# Patient Record
Sex: MALE | Race: ASIAN | NOT HISPANIC OR LATINO | URBAN - METROPOLITAN AREA
[De-identification: names, ages, dates, MRNs, and addresses within clinical notes are randomized per-mention and may not be internally consistent; named-entity substitution may affect disease eponyms.]

---
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
## 2022-02-09 ENCOUNTER — EMERGENCY (EMERGENCY)
Facility: HOSPITAL | Age: 22
LOS: 1 days | Discharge: HOME | End: 2022-02-09
Attending: EMERGENCY MEDICINE | Admitting: EMERGENCY MEDICINE
Payer: MEDICAID

## 2022-02-09 VITALS
SYSTOLIC BLOOD PRESSURE: 119 MMHG | HEART RATE: 74 BPM | DIASTOLIC BLOOD PRESSURE: 67 MMHG | OXYGEN SATURATION: 100 % | RESPIRATION RATE: 18 BRPM

## 2022-02-09 VITALS
OXYGEN SATURATION: 100 % | SYSTOLIC BLOOD PRESSURE: 127 MMHG | RESPIRATION RATE: 18 BRPM | HEART RATE: 88 BPM | DIASTOLIC BLOOD PRESSURE: 76 MMHG | TEMPERATURE: 98 F | HEIGHT: 68.9 IN

## 2022-02-09 PROBLEM — Z78.9 OTHER SPECIFIED HEALTH STATUS: Chronic | Status: ACTIVE | Noted: 2021-08-31

## 2022-02-09 LAB
ALBUMIN SERPL ELPH-MCNC: 4.5 G/DL — SIGNIFICANT CHANGE UP (ref 3.3–5)
ALP SERPL-CCNC: 52 U/L — LOW (ref 60–270)
ALT FLD-CCNC: 13 U/L — SIGNIFICANT CHANGE UP (ref 4–41)
ANION GAP SERPL CALC-SCNC: 9 MMOL/L — SIGNIFICANT CHANGE UP (ref 7–14)
APPEARANCE UR: CLEAR — SIGNIFICANT CHANGE UP
AST SERPL-CCNC: 14 U/L — SIGNIFICANT CHANGE UP (ref 4–40)
BACTERIA # UR AUTO: ABNORMAL
BASOPHILS # BLD AUTO: 0.04 K/UL — SIGNIFICANT CHANGE UP (ref 0–0.2)
BASOPHILS NFR BLD AUTO: 0.6 % — SIGNIFICANT CHANGE UP (ref 0–2)
BILIRUB SERPL-MCNC: 0.6 MG/DL — SIGNIFICANT CHANGE UP (ref 0.2–1.2)
BILIRUB UR-MCNC: NEGATIVE — SIGNIFICANT CHANGE UP
BUN SERPL-MCNC: 8 MG/DL — SIGNIFICANT CHANGE UP (ref 7–23)
CALCIUM SERPL-MCNC: 9.8 MG/DL — SIGNIFICANT CHANGE UP (ref 8.4–10.5)
CHLORIDE SERPL-SCNC: 102 MMOL/L — SIGNIFICANT CHANGE UP (ref 98–107)
CO2 SERPL-SCNC: 27 MMOL/L — SIGNIFICANT CHANGE UP (ref 22–31)
COLOR SPEC: SIGNIFICANT CHANGE UP
CREAT SERPL-MCNC: 0.9 MG/DL — SIGNIFICANT CHANGE UP (ref 0.5–1.3)
DIFF PNL FLD: NEGATIVE — SIGNIFICANT CHANGE UP
EOSINOPHIL # BLD AUTO: 0.07 K/UL — SIGNIFICANT CHANGE UP (ref 0–0.5)
EOSINOPHIL NFR BLD AUTO: 1 % — SIGNIFICANT CHANGE UP (ref 0–6)
EPI CELLS # UR: 0 /HPF — SIGNIFICANT CHANGE UP (ref 0–5)
GLUCOSE SERPL-MCNC: 88 MG/DL — SIGNIFICANT CHANGE UP (ref 70–99)
GLUCOSE UR QL: NEGATIVE — SIGNIFICANT CHANGE UP
HCT VFR BLD CALC: 41.9 % — SIGNIFICANT CHANGE UP (ref 39–50)
HGB BLD-MCNC: 13.9 G/DL — SIGNIFICANT CHANGE UP (ref 13–17)
IANC: 4.54 K/UL — SIGNIFICANT CHANGE UP (ref 1.5–8.5)
IMM GRANULOCYTES NFR BLD AUTO: 0.3 % — SIGNIFICANT CHANGE UP (ref 0–1.5)
KETONES UR-MCNC: NEGATIVE — SIGNIFICANT CHANGE UP
LEUKOCYTE ESTERASE UR-ACNC: NEGATIVE — SIGNIFICANT CHANGE UP
LYMPHOCYTES # BLD AUTO: 2.13 K/UL — SIGNIFICANT CHANGE UP (ref 1–3.3)
LYMPHOCYTES # BLD AUTO: 29.3 % — SIGNIFICANT CHANGE UP (ref 13–44)
MCHC RBC-ENTMCNC: 24.1 PG — LOW (ref 27–34)
MCHC RBC-ENTMCNC: 33.2 GM/DL — SIGNIFICANT CHANGE UP (ref 32–36)
MCV RBC AUTO: 72.6 FL — LOW (ref 80–100)
MONOCYTES # BLD AUTO: 0.46 K/UL — SIGNIFICANT CHANGE UP (ref 0–0.9)
MONOCYTES NFR BLD AUTO: 6.3 % — SIGNIFICANT CHANGE UP (ref 2–14)
NEUTROPHILS # BLD AUTO: 4.54 K/UL — SIGNIFICANT CHANGE UP (ref 1.8–7.4)
NEUTROPHILS NFR BLD AUTO: 62.5 % — SIGNIFICANT CHANGE UP (ref 43–77)
NITRITE UR-MCNC: POSITIVE
NRBC # BLD: 0 /100 WBCS — SIGNIFICANT CHANGE UP
NRBC # FLD: 0 K/UL — SIGNIFICANT CHANGE UP
PH UR: 6 — SIGNIFICANT CHANGE UP (ref 5–8)
PLATELET # BLD AUTO: 228 K/UL — SIGNIFICANT CHANGE UP (ref 150–400)
POTASSIUM SERPL-MCNC: 3.8 MMOL/L — SIGNIFICANT CHANGE UP (ref 3.5–5.3)
POTASSIUM SERPL-SCNC: 3.8 MMOL/L — SIGNIFICANT CHANGE UP (ref 3.5–5.3)
PROT SERPL-MCNC: 7.2 G/DL — SIGNIFICANT CHANGE UP (ref 6–8.3)
PROT UR-MCNC: NEGATIVE — SIGNIFICANT CHANGE UP
RBC # BLD: 5.77 M/UL — SIGNIFICANT CHANGE UP (ref 4.2–5.8)
RBC # FLD: 15.5 % — HIGH (ref 10.3–14.5)
RBC CASTS # UR COMP ASSIST: 0 /HPF — SIGNIFICANT CHANGE UP (ref 0–4)
SARS-COV-2 RNA SPEC QL NAA+PROBE: SIGNIFICANT CHANGE UP
SODIUM SERPL-SCNC: 138 MMOL/L — SIGNIFICANT CHANGE UP (ref 135–145)
SP GR SPEC: 1.01 — SIGNIFICANT CHANGE UP (ref 1–1.05)
UROBILINOGEN FLD QL: SIGNIFICANT CHANGE UP
WBC # BLD: 7.26 K/UL — SIGNIFICANT CHANGE UP (ref 3.8–10.5)
WBC # FLD AUTO: 7.26 K/UL — SIGNIFICANT CHANGE UP (ref 3.8–10.5)
WBC UR QL: 4 /HPF — SIGNIFICANT CHANGE UP (ref 0–5)

## 2022-02-09 PROCEDURE — 99284 EMERGENCY DEPT VISIT MOD MDM: CPT

## 2022-02-09 RX ORDER — CEPHALEXIN 500 MG
500 CAPSULE ORAL ONCE
Refills: 0 | Status: COMPLETED | OUTPATIENT
Start: 2022-02-09 | End: 2022-02-09

## 2022-02-09 RX ORDER — CEPHALEXIN 500 MG
1 CAPSULE ORAL
Qty: 20 | Refills: 0
Start: 2022-02-09 | End: 2022-02-18

## 2022-02-09 RX ADMIN — Medication 500 MILLIGRAM(S): at 20:32

## 2022-02-09 NOTE — ED PROVIDER NOTE - OBJECTIVE STATEMENT
19 yo M PMHx of LE weakness, paresthesias, difficulty ambulating, and urinary retention with MRI spine showing cavernous malformation now s/p T4-5 laminectomy tumor, T3-6 posterior fusion on 9/1/21. Patient overall shows improvement post-surgery with increased movement of the RLE. 21 yo M PMHx of LE weakness, paresthesias, difficulty ambulating, and urinary retention with MRI spine showing cavernous malformation now s/p T4-5 laminectomy tumor, T3-6 posterior fusion on 9/1/21, presenting to ED as directed by his neurosurgeon for intermittent back pain, right sided, sharp, radiating to left since Sept 2021. Pt reports LLE shaking w/ ambulation, but denies new neurological deficits. Baseline LE weakness, R>L. Not undergoing PT x 1 mo due to problems w/ transport. Also reports few days of dizziness, nausea w/o vomiting, and HA in AM.     Neurosurg: Dr. Restrepo 21 yo M PMHx of LE weakness, paresthesias, difficulty ambulating, and urinary retention with MRI spine showing cavernous malformation now s/p T4-5 laminectomy tumor, T3-6 posterior fusion on 21, presenting to ED as directed by his neurosurgeon for intermittent back pain, right sided, sharp, radiating to left since 2021. Pt reports LLE shaking w/ ambulation, but denies new neurological deficits. Baseline LE weakness, R>L. Not undergoing PT x 1 mo due to problems w/ transport. Also reports few days of dizziness, nausea w/o vomiting, and HA in AM.     Neurosurg: Dr. Restrepo    Attendinyo male presents with back pain for several weeks.  went to see neurosurgeon today who ordered outpatient CT and MRI.  pt here for CT and MRI.  no neuro defecits which are new today.  no fever or chills.  no vomiting. 19 yo M PMHx of LE weakness, paresthesias, difficulty ambulating, and urinary retention with MRI spine showing cavernous malformation now s/p T4-5 laminectomy tumor, T3-6 posterior fusion on 21, presenting to ED as directed by his neurosurgeon for intermittent back pain, right sided, sharp, radiating to left since 2021. Pt reports LLE shaking w/ ambulation, but denies new neurological deficits. Baseline LE weakness, R>L. Not undergoing PT x 1 mo due to problems w/ transport. Also reports few days of dizziness, nausea w/o vomiting, and HA in AM. Straight caths for urine. Recently treated for UTI.    Neurosurg: Dr. Restrepo    Attendinyo male presents with back pain for several weeks.  went to see neurosurgeon today who ordered outpatient CT and MRI.  pt here for CT and MRI.  no neuro defecits which are new today.  no fever or chills.  no vomiting.

## 2022-02-09 NOTE — ED PROVIDER NOTE - CLINICAL SUMMARY MEDICAL DECISION MAKING FREE TEXT BOX
Back pain.  discussed with neurosurgery team.  no indication for acute imaging today.  will defer imaging to outpatient.  will check CBC, CMP and likely discharge if unremarkable. Back pain.  discussed with neurosurgery team.  no indication for acute imaging today.  will defer imaging to outpatient.  will check CBC, CMP, UA and likely discharge if unremarkable.

## 2022-02-09 NOTE — ED ADULT TRIAGE NOTE - CHIEF COMPLAINT QUOTE
states" I am having a lot of back pain since few days and my doctor sent me here to get CT/MRI". h/o Cavernoma  removed in 2021 August , ambulates with walker with h/o post surgery paralysis and weakness.

## 2022-02-09 NOTE — ED ADULT NURSE REASSESSMENT NOTE - NS ED NURSE REASSESS COMMENT FT1
Received report from NHUNG Mello. Pt is A&x4, ambulatory with a walker. Medicated per MAR. Breathing even and unlabored. NAD. DC papers provided, IV removed. PT wheeled to exit, accompanied by pt's family member.

## 2022-02-09 NOTE — ED PROVIDER NOTE - PATIENT PORTAL LINK FT
You can access the FollowMyHealth Patient Portal offered by [facility] by registering at the following website: [email or web address]. By joining [employer]’s FollowMyHealth portal, you will also be able to view your health information using other applications (apps) compatible with our system.

## 2022-02-09 NOTE — ED PROVIDER NOTE - NSFOLLOWUPINSTRUCTIONS_ED_ALL_ED_FT
Follow up with your surgeon, Dr. Gomez.     Seek immediate medical assistance for any new or worsening symptoms.     Please take 600 mg of Ibuprofen (aka Motrin, Advil) and/or 650 mg Acetaminophen (aka Tylenol) every 6 hours, as needed, for mild-moderate pain.      If your urinalysis is positive, Paul will call you to update you.

## 2022-02-09 NOTE — ED PROVIDER NOTE - PROGRESS NOTE DETAILS
Tee Maurice PGY2: Case discussed w/ neurosurgical team. They spoke w/ Dr. Restrepo and he states no indication for imaging in ED. Should be done outpt. Yoandy - pt received in sign out. pt does not want to wait for UA. will dc.

## 2022-02-09 NOTE — ED ADULT NURSE NOTE - OBJECTIVE STATEMENT
Pt received into spot #3. AAOx4, sent in by neuro for CT/MRI of back. Pt c/o back pain x few days and worsening LE weakness. c/o urinary incontinece. Pt states RLE more weak than the LLE. Pt has history of paralysis with laminectomy tumor post surgery. Pt states he ambulates with walker at baseline. MD mendez performed. Pt received into spot #3. AAOx4, sent in by neuro for CT/MRI of back. Pt c/o back pain x few days and worsening LE weakness. c/o urinary incontinece. Pt states RLE more weak than the LLE. Pt has history of paralysis with laminectomy tumor post surgery. Pt states he ambulates with walker at baseline. MD mendez performed. #20g IV placed to right ac, labs drawn and sent. no acute distress. Awaiting further plan of care.

## 2022-08-06 NOTE — ED PROVIDER NOTE - NS ED ROS FT
Gen: Denies fever.   HEENT: Denies headache. Denies congestion.  CV: Denies chest pain. Denies lightheadedness.  Skin: Denies rash.   Resp: Denies SOB. Denies cough.  GI: Denies abd pain. +nausea. Denies vomiting. Denies diarrhea. Denies melena. +hematochezia.  Msk: Denies extremity swelling. Denies extremity pain. + back pain.   : Denies dysuria. Denies hematuria.  Neuro: Denies LOC. Denies dizziness. Denies new numbness/tingling. Denies new focal weakness.  Psych: Denies SI sudden onset
